# Patient Record
Sex: MALE | Race: WHITE | NOT HISPANIC OR LATINO | Employment: UNEMPLOYED | URBAN - METROPOLITAN AREA
[De-identification: names, ages, dates, MRNs, and addresses within clinical notes are randomized per-mention and may not be internally consistent; named-entity substitution may affect disease eponyms.]

---

## 2020-12-14 NOTE — PROGRESS NOTES
Subjective     Jeronimo Rodriguez is a 30 year old male who presents to clinic today for the following health issues:    HPI       Depression and Anxiety Follow-Up    How are you doing with your depression since your last visit? No change    How are you doing with your anxiety since your last visit?  Worsened     Are you having other symptoms that might be associated with depression or anxiety? Yes:  shakey, nervous, hard to sleep     Have you had a significant life event? No     Do you have any concerns with your use of alcohol or other drugs? No    Social History     Tobacco Use     Smoking status: Current Every Day Smoker     Smokeless tobacco: Never Used   Substance Use Topics     Alcohol use: Not Currently     Drug use: Not Currently     Types: Opiates, Amphetamines     PHQ 12/15/2020   PHQ-9 Total Score 25   Q9: Thoughts of better off dead/self-harm past 2 weeks Nearly every day     No flowsheet data found.  Last PHQ-9 12/15/2020   1.  Little interest or pleasure in doing things 3   2.  Feeling down, depressed, or hopeless 3   3.  Trouble falling or staying asleep, or sleeping too much 2   4.  Feeling tired or having little energy 2   5.  Poor appetite or overeating 3   6.  Feeling bad about yourself 3   7.  Trouble concentrating 3   8.  Moving slowly or restless 3   Q9: Thoughts of better off dead/self-harm past 2 weeks 3   PHQ-9 Total Score 25   Difficulty at work, home, or with people Extremely dIfficult     No flowsheet data found.    Suicide Assessment Five-step Evaluation and Treatment (SAFE-T)        Establish care-     Pt with depression and anxiety. Doing okay on zyprexa per pt  Has follow-up appt with psych next month  Denies any thoughts of hurting himself at this time. Feels the zyprexa is helpful  Notes his restless leg seems to be flared up with this medication  He has had it in the past  Notes it gets better if he walks at night  Normal hgb recently  Will do trial of gabapentin at night to see if  helps  If not helpful would recommend follow-up with psych to see if can change med zyprexa if this is the culprit for his symptoms    Review of Systems   Constitutional, HEENT, cardiovascular, pulmonary, gi and gu systems are negative, except as otherwise noted.      Objective    /82   Pulse 76   Temp 97.8  F (36.6  C) (Oral)   Ht 1.829 m (6')   Wt 84.8 kg (187 lb)   BMI 25.36 kg/m    Body mass index is 25.36 kg/m .  Physical Exam   GENERAL: healthy, alert and no distress  RESP: lungs clear to auscultation - no rales, rhonchi or wheezes  CV: regular rate and rhythm, normal S1 S2, no S3 or S4, no murmur, click or rub, no peripheral edema and peripheral pulses strong    No results found for this or any previous visit (from the past 24 hour(s)).        Assessment & Plan     Restless leg syndrome  Follow-up if not improving  - gabapentin (NEURONTIN) 300 MG capsule; Take one tablet QPM for 3 days, may increase to 2 tablets per QPM     Tobacco Cessation:   reports that he has been smoking. He has never used smokeless tobacco.  Tobacco Cessation Action Plan: Information offered: Patient not interested at this time      BMI:   Estimated body mass index is 25.36 kg/m  as calculated from the following:    Height as of this encounter: 1.829 m (6').    Weight as of this encounter: 84.8 kg (187 lb).          Depression Screening Follow Up    PHQ 12/15/2020   PHQ-9 Total Score 25   Q9: Thoughts of better off dead/self-harm past 2 weeks Nearly every day                 Follow Up    Follow Up Actions Taken      Discussed the following ways the patient can remain in a safe environment:            No follow-ups on file.    Linda Maher MD  [facility]

## 2020-12-15 ENCOUNTER — OFFICE VISIT (OUTPATIENT)
Dept: FAMILY MEDICINE | Facility: CLINIC | Age: 30
End: 2020-12-15
Payer: COMMERCIAL

## 2020-12-15 VITALS
WEIGHT: 187 LBS | SYSTOLIC BLOOD PRESSURE: 130 MMHG | HEART RATE: 76 BPM | TEMPERATURE: 97.8 F | DIASTOLIC BLOOD PRESSURE: 82 MMHG | BODY MASS INDEX: 25.33 KG/M2 | HEIGHT: 72 IN

## 2020-12-15 DIAGNOSIS — G25.81 RESTLESS LEG SYNDROME: Primary | ICD-10-CM

## 2020-12-15 PROCEDURE — 96127 BRIEF EMOTIONAL/BEHAV ASSMT: CPT | Performed by: FAMILY MEDICINE

## 2020-12-15 PROCEDURE — 99213 OFFICE O/P EST LOW 20 MIN: CPT | Performed by: FAMILY MEDICINE

## 2020-12-15 RX ORDER — GABAPENTIN 300 MG/1
CAPSULE ORAL
Qty: 60 CAPSULE | Refills: 1 | Status: SHIPPED | OUTPATIENT
Start: 2020-12-15

## 2020-12-15 RX ORDER — OLANZAPINE 10 MG/1
10 TABLET ORAL
COMMUNITY
Start: 2020-12-01 | End: 2021-01-08

## 2020-12-15 SDOH — HEALTH STABILITY: MENTAL HEALTH: HOW MANY STANDARD DRINKS CONTAINING ALCOHOL DO YOU HAVE ON A TYPICAL DAY?: NOT ASKED

## 2020-12-15 SDOH — HEALTH STABILITY: MENTAL HEALTH: HOW OFTEN DO YOU HAVE A DRINK CONTAINING ALCOHOL?: NOT ASKED

## 2020-12-15 SDOH — HEALTH STABILITY: MENTAL HEALTH: HOW OFTEN DO YOU HAVE 6 OR MORE DRINKS ON ONE OCCASION?: NOT ASKED

## 2020-12-15 ASSESSMENT — PATIENT HEALTH QUESTIONNAIRE - PHQ9: SUM OF ALL RESPONSES TO PHQ QUESTIONS 1-9: 25

## 2020-12-15 ASSESSMENT — MIFFLIN-ST. JEOR: SCORE: 1841.23

## 2020-12-31 ENCOUNTER — VIRTUAL VISIT (OUTPATIENT)
Dept: PSYCHOLOGY | Facility: CLINIC | Age: 30
End: 2020-12-31
Payer: COMMERCIAL

## 2020-12-31 DIAGNOSIS — F43.23 ADJUSTMENT DISORDER WITH MIXED ANXIETY AND DEPRESSED MOOD: Primary | ICD-10-CM

## 2020-12-31 PROCEDURE — 90834 PSYTX W PT 45 MINUTES: CPT | Mod: 95 | Performed by: SOCIAL WORKER

## 2020-12-31 NOTE — PROGRESS NOTES
"                                           Progress Note    Patient Name: Jeronimo Rodriguez  Date: December 31st, 2020         Service Type: Individual      Session Start Time: 8:37 am   Session End Time: ***     Session Length: ***    Session #: 1st session    Attendees: Client attended alone    Service Modality:  Video Visit:      Provider verified identity through the following two step process.  Patient provided:  Patient photo    Telemedicine Visit: The patient's condition can be safely assessed and treated via synchronous audio and visual telemedicine encounter.      Reason for Telemedicine Visit: Patient has requested telehealth visit    Originating Site (Patient Location): Patient's home    Distant Site (Provider Location): Provider Remote Setting    Consent:  The patient/guardian has verbally consented to: the potential risks and benefits of telemedicine (video visit) versus in person care; bill my insurance or make self-payment for services provided; and responsibility for payment of non-covered services.     Patient would like the video invitation sent by: None    Mode of Communication:  Video Conference via [employer]    As the provider I attest to compliance with applicable laws and regulations related to telemedicine.     Treatment Plan Last Reviewed: None-1st session   PHQ-9 / IBRAHIMA-7 : ***    DATA  Interactive Complexity: {25521 add on - Interactive Complexity:096934}  Crisis: {87980 (60 min) / 85522 (30 min add-on; stackable if needed):105433}     Progress Since Last Session (Related to Symptoms / Goals / Homework):   Symptoms: {FCCStatusSinceLastSession:150136} No interest in life-doesn't want to go to school, doesn't want to do anything, no motivation, depression, suicidal at times, bored, unhappy, \"I don't belong in this world with everything...money etc\".   Referred to therapy by someone; uncertain who.     Homework: None-1st session.       Episode of Care Goals: {Goals Progress:084724}     Current / " "Ongoing Stressors and Concerns:   ***     Treatment Objective(s) Addressed in This Session:   {Treatment Objective Groups:043880}  ***     Intervention:   {FCCINTERVENTION:859061}        ASSESSMENT: Current Emotional / Mental Status (status of significant symptoms):   Risk status (Self / Other harm or suicidal ideation)   Patient {PERSONAL SAFETY:583274}   Patient {SUICIDAL IDEATION:283278}   Patient {HOMICIDAL IDEATION:944569}   Patient {SELF INJURIOUS:734412}   Patient {OTHER SAFETY CONCERNS:295175}   Patient {FCC CHANGE IN RISK FACTORS:523366}   Patient {FCC CHANGE IN PROTECTIVE FACTORS:375405}   {SAFETY PLAN:571886}     Appearance:   {Appearance:242826::\"Appropriate \"}   Eye Contact:   {Eye Contact:701207::\"Good \"}   Psychomotor Behavior: {Psychomotor Behavior:825011::\"Normal \"}   Attitude:   {Attitude:694170::\"Cooperative \"}   Orientation:   {Orientation:113554::\"All\"}   Speech    Rate / Production: {Speech Rate/Production:965691::\"Normal \"}    Volume:  {Speech Volume:131379::\"Normal \"}   Mood:    {Mood:526207::\"Normal\"}   Affect:    {Affect:476155::\"Appropriate \"}   Thought Content:  {Thought Content:104878::\"Clear \"}   Thought Form:  {Thought Form:912260::\"Coherent \",\"Logical \"}   Insight:    {Insight:585102::\"Good \"}     Medication Review:   {Med Review:140116}     Medication Compliance:   {Changes:989898}     Changes in Health Issues:   {YES / NO:304627}     Chemical Use Review:   Substance Use: \"I try to quit and then I start again. I try to quit heroin and  then it is worse without it. I think I will do this then I will be happy and then  it is is worse.\" Stopped using meth a few weeks ago then 3-4 months  ago stopped using heroin. Reports he just decided to stop. The patient  reports that he has gone to treatment and AA/NA in the past and has  gone within the past few weeks.      Tobacco Use: Smoking cigarettes about a pack per day.     Diagnosis:  No diagnosis found.    Collateral Reports " Completed:   {[facility] COLLATERAL:559014}    PLAN: (Patient Tasks / Therapist Tasks / Other)  ***        Silke Luevano, [facility], [facility]

## 2020-12-31 NOTE — PROGRESS NOTES
"                                           Progress Note    Patient Name: Jeronimo Rodriguez  Date: December 31st, 2020         Service Type: Individual      Session Start Time: 8:37 am   Session End Time: 9:17 am     Session Length: 40 minutes     Session #: 1st session    Attendees: Client attended alone    Service Modality:  Video Visit:      Provider verified identity through the following two step process.  Patient provided:  Patient photo    Telemedicine Visit: The patient's condition can be safely assessed and treated via synchronous audio and visual telemedicine encounter.      Reason for Telemedicine Visit: Patient has requested telehealth visit    Originating Site (Patient Location): Patient's home    Distant Site (Provider Location): Provider Remote Setting    Consent:  The patient/guardian has verbally consented to: the potential risks and benefits of telemedicine (video visit) versus in person care; bill my insurance or make self-payment for services provided; and responsibility for payment of non-covered services.     Patient would like the video invitation sent by: None    Mode of Communication:  Video Conference via [employer]    As the provider I attest to compliance with applicable laws and regulations related to telemedicine.     Treatment Plan Last Reviewed: None-1st session   PHQ-9 / IBRAHIMA-7 : Will work on completing it next session.     DATA  Interactive Complexity: No  Crisis: No     Progress Since Last Session (Related to Symptoms / Goals / Homework):   Symptoms: No interest in life-doesn't want to go to school, doesn't want to do anything, no motivation, depression, suicidal at times, bored, unhappy, \"I don't belong in this world with everything...money etc\".   Laying in bed from 530pm to later in the morning.   Referred to therapy by someone; uncertain who.     Homework: None-1st session.       Episode of Care Goals: No improvement - PRECONTEMPLATION (Not seeing need for change); Intervened by " educating the patient about the effects of current behavior on health.  Evoked information about reasons to continue behavior, express concern / recommendations, and explored any change talk     Current / Ongoing Stressors and Concerns:   Stressed with COVID, low motivation, low energy, no interest in doing  anything.      Treatment Objective(s) Addressed in This Session:   Mainly focused on getting to know the patient-goals should be to stay sober and also work towards improving depression and mental health symptoms.      Intervention:   Motivational Interviewing    MI Intervention: Expressed Empathy/Understanding, Supported Autonomy, Collaboration, Evocation, Permission to raise concern or advise, Open-ended questions, Reflections: simple and complex, Change talk (evoked) and Reframe     Change Talk Expressed by the Patient: Desire to change Ability to change Reasons to change Need to change Committment to change Activation Taking steps    Provider Response to Change Talk: E - Evoked more info from patient about behavior change, A - Affirmed patient's thoughts, decisions, or attempts at behavior change, R - Reflected patient's change talk and S - Summarized patient's change talk statements          ASSESSMENT: Current Emotional / Mental Status (status of significant symptoms):   Risk status (Self / Other harm or suicidal ideation)   Patient denies current fears or concerns for personal safety.   Patient denies current or recent suicidal ideation or behaviors.  Reports doing better, but limited things to do. He denies recent suicidal thoughts.    Patient denies current or recent homicidal ideation or behaviors.   Patient denies current or recent self injurious behavior or ideation.   Patient denies other safety concerns.   Patient reports there has been no change in risk factors since their last session.     Patient reports there has been no change in protective factors since their last session.     Recommended  "that patient call 911 or go to the local ED should there be a change in any of these risk factors. the patient has a history of SI, but we will not get to a safety crisis assessment today.      Appearance:   Wearing a hoodie and has the candelario pulled up, so hard to tell exactly how his apperance is.    Eye Contact:   Fair    Psychomotor Behavior: Restless    Attitude:   Cooperative    Orientation:   All   Speech    Rate / Production: Talkative    Volume:  Normal    Mood:    Anxious  Depressed  Sad    Affect:    Flat    Thought Content:  Clear    Thought Form:  Flight of Ideas    Insight:    Fair      Medication Review:   No changes to current psychiatric medication(s)- Bipolar and Restless Nerve  Damage-Neurontin 300 mgs and Zyprexa 10 mgs. Medications cause him to be tired. They lowered the dose to help with the patient being tired and this did help some, but not a lot.    Uncertain who he sees for the Gabapentin and Zyprexa because he has seen different people between ER's and seeing doctors.      Medication Compliance:   Yes     Changes in Health Issues:   \"I think I am going through liver failure because of drug use and have tried to kill myself often, so my body seems to be shutting down\" Reports he thinks he has hepatitis.    Recommend patient follow-up with a doctor to get his hepatitis checked out further.      Chemical Use Review:   Substance Use: \"I try to quit and then I start again. I try to quit heroin and  then it is worse without it. I think I will do this then I will be happy and then  it is is worse.\" Stopped using meth a few weeks ago then 3-4 months  ago stopped using heroin. Reports he just decided to stop. The patient  reports that he has gone to treatment and AA/NA in the past and has  gone within the past few weeks.    Patient reports he had a near death experience when he quit using heroin and he stopped using substances.      Tobacco Use: Smoking cigarettes about a pack per day. "     Diagnosis:  1. Adjustment disorder with mixed anxiety and depressed mood      Collateral Reports Completed:   Not Applicable    PLAN: (Patient Tasks / Therapist Tasks / Other)    We will continue to work on doing the DA and Treatment Plan going forward. Will need to follow patient for substance abuse as his recent use could be really concerning for continuing therapy, so will monitor this. He could use support for his depression. I did have to redirect him related to asking personal questions of myself. He would benefit from weekly sessions. We should complete the Crisis Assessment and also do the IBRAHIMA-7 in the next couple sessions.     Silke Luevano, JANNETTE, LADC

## 2021-01-15 ENCOUNTER — HEALTH MAINTENANCE LETTER (OUTPATIENT)
Age: 31
End: 2021-01-15

## 2021-09-19 ENCOUNTER — HEALTH MAINTENANCE LETTER (OUTPATIENT)
Age: 31
End: 2021-09-19

## 2022-03-06 ENCOUNTER — HEALTH MAINTENANCE LETTER (OUTPATIENT)
Age: 32
End: 2022-03-06

## 2022-08-06 ENCOUNTER — OFFICE VISIT (OUTPATIENT)
Dept: URGENT CARE | Facility: URGENT CARE | Age: 32
End: 2022-08-06
Payer: COMMERCIAL

## 2022-08-06 VITALS
SYSTOLIC BLOOD PRESSURE: 128 MMHG | BODY MASS INDEX: 25.76 KG/M2 | RESPIRATION RATE: 12 BRPM | DIASTOLIC BLOOD PRESSURE: 80 MMHG | OXYGEN SATURATION: 98 % | TEMPERATURE: 98.8 F | HEART RATE: 72 BPM | WEIGHT: 189.9 LBS

## 2022-08-06 DIAGNOSIS — R53.83 FATIGUE, UNSPECIFIED TYPE: ICD-10-CM

## 2022-08-06 DIAGNOSIS — R11.2 NON-INTRACTABLE VOMITING WITH NAUSEA, UNSPECIFIED VOMITING TYPE: ICD-10-CM

## 2022-08-06 DIAGNOSIS — G44.209 TENSION HEADACHE: Primary | ICD-10-CM

## 2022-08-06 LAB
FLUAV AG SPEC QL IA: NEGATIVE
FLUBV AG SPEC QL IA: NEGATIVE

## 2022-08-06 PROCEDURE — 99213 OFFICE O/P EST LOW 20 MIN: CPT | Mod: CS | Performed by: PHYSICIAN ASSISTANT

## 2022-08-06 PROCEDURE — 87804 INFLUENZA ASSAY W/OPTIC: CPT | Performed by: PHYSICIAN ASSISTANT

## 2022-08-06 PROCEDURE — U0005 INFEC AGEN DETEC AMPLI PROBE: HCPCS | Performed by: PHYSICIAN ASSISTANT

## 2022-08-06 PROCEDURE — U0003 INFECTIOUS AGENT DETECTION BY NUCLEIC ACID (DNA OR RNA); SEVERE ACUTE RESPIRATORY SYNDROME CORONAVIRUS 2 (SARS-COV-2) (CORONAVIRUS DISEASE [COVID-19]), AMPLIFIED PROBE TECHNIQUE, MAKING USE OF HIGH THROUGHPUT TECHNOLOGIES AS DESCRIBED BY CMS-2020-01-R: HCPCS | Performed by: PHYSICIAN ASSISTANT

## 2022-08-06 NOTE — PATIENT INSTRUCTIONS
Drink plenty of fluids and rest  Go to the emergency room if pain worsens or persists beyond 5 days

## 2022-08-06 NOTE — PROGRESS NOTES
"Assessment & Plan     Tension headache  - Influenza A & B Antigen - Clinic Collect  - Symptomatic; Unknown COVID-19 Virus (Coronavirus) by PCR Nasopharyngeal    Fatigue, unspecified type  - Influenza A & B Antigen - Clinic Collect  - Symptomatic; Unknown COVID-19 Virus (Coronavirus) by PCR Nasopharyngeal    Non-intractable vomiting with nausea, unspecified vomiting type  - Influenza A & B Antigen - Clinic Collect    Go to emergency room if pain worsens or does not resolve in 5 days.    15 minutes spent on the date of the encounter doing chart review, patient visit, and documentation     Return in about 1 week (around 8/13/2022) for visit with primary care provider if symptoms are persistent.     [facility]    Subjective   Jeronimo Rodriguez is a 31 year old who presents for the following health issues     HPI    Jeronimo presents clinic today for evaluation of a headache.  Symptoms first began 3 days ago.  He had a headache and vomiting.  He has vomited once on the first day and since then has been feeling nauseous but not vomiting.  He describes the pain as feeling like it is behind his eyes and it comes and goes. He states, \"Its just a normal headache.\"  Eyes feel dry and itchy.  No changes in vision.  Nausea seems to come and go as well.  He is been trying to drink water just to make it helps with his nausea and headache.  He is been taking Tylenol which has not seemed to make much of a difference.  He has been feeling feverish but also has not taken his temperature.  He is sleeping okay but just notes that when he wakes that he generally has a headache.    Review of Systems   ROS negative except as stated above.      Objective    /80   Pulse 72   Temp 98.8  F (37.1  C) (Tympanic)   Resp 12   Wt 86.1 kg (189 lb 14.4 oz)   SpO2 98%   BMI 25.76 kg/m    Physical Exam   GENERAL: healthy, alert and no distress, sitting with eyes closed while discussing " symptoms.  EYES: Eyes grossly normal to inspection, PERRL and conjunctivae and sclerae normal  HENT: ear canals and TM's normal, nose and mouth without ulcers or lesions  NECK: no adenopathy, no asymmetry, masses, or scars and thyroid normal to palpation  RESP: lungs clear to auscultation - no rales, rhonchi or wheezes  CV: regular rate and rhythm, normal S1 S2, no S3 or S4, no murmur, click or rub, no peripheral edema and peripheral pulses strong  MS: no gross musculoskeletal defects noted, no edema  SKIN: no suspicious lesions or rashes  NEURO: Normal strength and tone, mentation intact and speech normal CN 2-12 grossly intact    Results for orders placed or performed in visit on 08/06/22   Influenza A & B Antigen - Clinic Collect     Status: Normal    Specimen: Nasopharyngeal; Swab   Result Value Ref Range    Influenza A antigen Negative Negative    Influenza B antigen Negative Negative    Narrative    Test results must be correlated with clinical data. If necessary, results should be confirmed by a molecular assay or viral culture.

## 2022-08-07 LAB — SARS-COV-2 RNA RESP QL NAA+PROBE: NEGATIVE

## 2022-08-08 ENCOUNTER — OFFICE VISIT (OUTPATIENT)
Dept: URGENT CARE | Facility: URGENT CARE | Age: 32
End: 2022-08-08
Payer: COMMERCIAL

## 2022-08-08 VITALS
HEART RATE: 68 BPM | TEMPERATURE: 98.6 F | DIASTOLIC BLOOD PRESSURE: 77 MMHG | HEIGHT: 72 IN | BODY MASS INDEX: 25.47 KG/M2 | RESPIRATION RATE: 16 BRPM | SYSTOLIC BLOOD PRESSURE: 122 MMHG | WEIGHT: 188 LBS | OXYGEN SATURATION: 98 %

## 2022-08-08 DIAGNOSIS — J06.9 UPPER RESPIRATORY TRACT INFECTION, UNSPECIFIED TYPE: Primary | ICD-10-CM

## 2022-08-08 LAB
ALBUMIN UR-MCNC: NEGATIVE MG/DL
APPEARANCE UR: CLEAR
BASOPHILS # BLD AUTO: 0 10E3/UL (ref 0–0.2)
BASOPHILS NFR BLD AUTO: 0 %
BILIRUB UR QL STRIP: ABNORMAL
COLOR UR AUTO: YELLOW
EOSINOPHIL # BLD AUTO: 0.1 10E3/UL (ref 0–0.7)
EOSINOPHIL NFR BLD AUTO: 2 %
ERYTHROCYTE [DISTWIDTH] IN BLOOD BY AUTOMATED COUNT: 12.6 % (ref 10–15)
GLUCOSE UR STRIP-MCNC: NEGATIVE MG/DL
HCT VFR BLD AUTO: 46.4 % (ref 40–53)
HGB BLD-MCNC: 16.4 G/DL (ref 13.3–17.7)
HGB UR QL STRIP: NEGATIVE
KETONES UR STRIP-MCNC: NEGATIVE MG/DL
LEUKOCYTE ESTERASE UR QL STRIP: NEGATIVE
LYMPHOCYTES # BLD AUTO: 2.3 10E3/UL (ref 0.8–5.3)
LYMPHOCYTES NFR BLD AUTO: 38 %
MCH RBC QN AUTO: 31.5 PG (ref 26.5–33)
MCHC RBC AUTO-ENTMCNC: 35.3 G/DL (ref 31.5–36.5)
MCV RBC AUTO: 89 FL (ref 78–100)
MONOCYTES # BLD AUTO: 0.5 10E3/UL (ref 0–1.3)
MONOCYTES NFR BLD AUTO: 8 %
NEUTROPHILS # BLD AUTO: 3.1 10E3/UL (ref 1.6–8.3)
NEUTROPHILS NFR BLD AUTO: 52 %
NITRATE UR QL: NEGATIVE
PH UR STRIP: 6 [PH] (ref 5–7)
PLATELET # BLD AUTO: 286 10E3/UL (ref 150–450)
RBC # BLD AUTO: 5.21 10E6/UL (ref 4.4–5.9)
SP GR UR STRIP: >=1.03 (ref 1–1.03)
UROBILINOGEN UR STRIP-ACNC: 0.2 E.U./DL
WBC # BLD AUTO: 6 10E3/UL (ref 4–11)

## 2022-08-08 PROCEDURE — 36415 COLL VENOUS BLD VENIPUNCTURE: CPT | Performed by: NURSE PRACTITIONER

## 2022-08-08 PROCEDURE — 81003 URINALYSIS AUTO W/O SCOPE: CPT | Performed by: NURSE PRACTITIONER

## 2022-08-08 PROCEDURE — 99213 OFFICE O/P EST LOW 20 MIN: CPT | Performed by: NURSE PRACTITIONER

## 2022-08-08 PROCEDURE — 85025 COMPLETE CBC W/AUTO DIFF WBC: CPT | Performed by: NURSE PRACTITIONER

## 2022-08-08 ASSESSMENT — PAIN SCALES - GENERAL: PAINLEVEL: SEVERE PAIN (7)

## 2022-08-08 NOTE — PROGRESS NOTES
SUBJECTIVE:   Jeronimo Rodriguez is a 31 year old male presenting with a chief complaint of cold symptoms and fatigue  Onset of symptoms was 5 day(s) ago.  Current and Associated symptoms: cough   No sore throat or runny nose, no fever, no urinary pain or abdominal pain  Had vomiting and diarrhea and headache that is gone  Treatment measures tried include: fluids rest  Predisposing factors include None.  Seen 2 days ago covid and influenza negative  Appetite down, drinking enough fluids.     No past medical history on file.  Current Outpatient Medications   Medication Sig Dispense Refill     gabapentin (NEURONTIN) 300 MG capsule Take one tablet QPM for 3 days, may increase to 2 tablets per QPM (Patient not taking: Reported on 8/6/2022) 60 capsule 1     OLANZapine (ZYPREXA) 10 MG tablet Take 10 mg by mouth       Social History     Tobacco Use     Smoking status: Current Every Day Smoker     Smokeless tobacco: Never Used   Substance Use Topics     Alcohol use: Not Currently       ROS:  Review of systems negative except as stated above.    OBJECTIVE:  /77   Pulse 68   Temp 98.6  F (37  C) (Tympanic)   Resp 16   Ht 1.829 m (6')   Wt 85.3 kg (188 lb)   SpO2 98%   BMI 25.50 kg/m    GENERAL APPEARANCE: alert and no distress  EYES: EOMI,  PERRL, conjunctiva clear  HENT: ear canals and TM's normal.  Nose and mouth without ulcers, erythema or lesions  NECK: supple, nontender, no lymphadenopathy  RESP: lungs clear to auscultation - no rales, rhonchi or wheezes  CV: regular rates and rhythm, normal S1 S2, no murmur noted  ABDOMEN:  soft, nontender, no HSM or masses and bowel sounds normal  NEURO: Normal strength and tone, sensory exam grossly normal,  normal speech and mentation  SKIN: no suspicious lesions or rashes    ASSESSMENT:  (J06.9) Upper respiratory tract infection, unspecified type  (primary encounter diagnosis)      PLAN:  UA and cbc normal  Likely viral 7-10 days  Vitals stable in clinic and hydrated  nontoxic appearing no distress. Symptoms seem to be slowly improving  Emergent symptoms as reviewed to ER  Not improving f/u with pcp in a few days.      Shruthi Gomez, APRN CNP

## 2022-08-20 ENCOUNTER — OFFICE VISIT (OUTPATIENT)
Dept: URGENT CARE | Facility: URGENT CARE | Age: 32
End: 2022-08-20
Payer: COMMERCIAL

## 2022-08-20 VITALS
SYSTOLIC BLOOD PRESSURE: 113 MMHG | OXYGEN SATURATION: 98 % | TEMPERATURE: 98.3 F | DIASTOLIC BLOOD PRESSURE: 68 MMHG | RESPIRATION RATE: 17 BRPM | HEART RATE: 83 BPM

## 2022-08-20 DIAGNOSIS — L08.9 SKIN INFECTION: Primary | ICD-10-CM

## 2022-08-20 PROCEDURE — 99213 OFFICE O/P EST LOW 20 MIN: CPT | Performed by: FAMILY MEDICINE

## 2022-08-20 RX ORDER — CEPHALEXIN 500 MG/1
500 CAPSULE ORAL 4 TIMES DAILY
Qty: 28 CAPSULE | Refills: 0 | Status: SHIPPED | OUTPATIENT
Start: 2022-08-20 | End: 2022-08-27

## 2022-08-20 ASSESSMENT — PAIN SCALES - GENERAL: PAINLEVEL: EXTREME PAIN (8)

## 2022-08-20 NOTE — PROGRESS NOTES
Assessment & Plan     Skin infection  Differential discussed in detail.  Suspect symptoms secondary to skin infection versus lymphadenopathy.  Cephalexin prescribed, common side effects discussed.  Recommended to keep area dry and clean and follow-up with PCP in 3 to 5 days or earlier if needed.  All questions answered.  - cephALEXin (KEFLEX) 500 MG capsule; Take 1 capsule (500 mg) by mouth 4 times daily for 7 days      Moshe Nava MD  [facility]    Columba Decker is a 31 year old presenting for the following health issues:  Urgent Care and Ear Problem       HPI   Complains of a small swelling under left ear, having it for about 1 week or so, painful and erythematous.  No Fever, chills, sore throat, runny nose or other relevant systemic symptoms.         Review of Systems   Constitutional, HEENT, cardiovascular, pulmonary, gi and gu systems are negative, except as otherwise noted.      Objective    /68   Pulse 83   Temp 98.3  F (36.8  C) (Tympanic)   Resp 17   SpO2 98%   There is no height or weight on file to calculate BMI.  Physical Exam   GENERAL: alert and no distress  HENT: nose and mouth without ulcers or lesions, oropharynx clear, oral mucous membranes moist and erythematous skin under left earlobe, tender on palpation, few acne lesions involving forehead and neck, no fluctuance, blisters or discharge noted  NECK: no adenopathy, no asymmetry, masses, or scars and thyroid normal to palpation  RESP: lungs clear to auscultation - no rales, rhonchi or wheezes  MS: no gross musculoskeletal defects noted, no edema  SKIN: As above  NEURO: Grossly intact                        .  ..

## 2022-11-21 ENCOUNTER — TELEPHONE (OUTPATIENT)
Dept: BEHAVIORAL HEALTH | Facility: CLINIC | Age: 32
End: 2022-11-21

## 2022-11-21 ENCOUNTER — HEALTH MAINTENANCE LETTER (OUTPATIENT)
Age: 32
End: 2022-11-21

## 2022-11-21 NOTE — TELEPHONE ENCOUNTER
[facility] attempted to reach patient for [facility] offer following up on ED/IHP report. Call went straight to voicemail and the voicemail box was full. [facility] will follow up with a [employer] message.     Monika Johnson [facility]  Behavioral Health Home ([facility])   [facility]  [phone]

## 2022-11-30 ENCOUNTER — TELEPHONE (OUTPATIENT)
Dept: BEHAVIORAL HEALTH | Facility: CLINIC | Age: 32
End: 2022-11-30

## 2022-11-30 NOTE — LETTER
Dear Jeronimo:    I am the Behavioral [facility] [place] Social Work Care Coordinator that works closely with your Primary Care Provider (PCP), to support your healthy living goals.  This letter serves to inform you that you are eligible for Behavioral Health Home Services that is a paid service through your insurance and is free of cost to you. Behavioral Health Home ([facility]) is a program created to help integrate your primary care clinic and provide services in addition to caring for your physical health. The following are examples of topics that we can assist with if you are to enroll in Behavioral Health Home ([facility]):    - Housing Resoures  - Transportation Resources  - Financial Resources  - Coordination with the [facility] for Benefits (MA, SNAP benefits, etc)  - Medical Appointments and Medication Costs  - Employment and Education  - Disability Related Information and Education  - Referrals to mental health services, chemical dependency assessment/treatment, etc     I have enclosed the [facility] Handout that offers basic information of what our program entails. If you are interested in learning more about and/or enrolling in Behavioral Health Home services, you may ask your PCP to connect you with me or feel free to contact me to schedule an appointment. My contact information is listed below.    I look forward to hearing from you!        Monika Johnson, [facility]  Behavioral [facility] ([facility])   [phone]

## 2022-11-30 NOTE — TELEPHONE ENCOUNTER
[facility] attempted to call patient for second attempt of [facility] offer but the call went straight to voicemail and the voicemail has not been set up yet. [facility] noticed that [employer] message was not read. [facility] will follow up with a letter. [facility] checked to see if there were any upcoming appointments but there are not.     Monika Johnson LGSW [facility] ([facility])   [facility]  [phone]

## 2022-12-09 ENCOUNTER — TELEPHONE (OUTPATIENT)
Dept: BEHAVIORAL HEALTH | Facility: CLINIC | Age: 32
End: 2022-12-09

## 2022-12-09 NOTE — TELEPHONE ENCOUNTER
[facility] received another alert due to ED/IP utilization.  Completed another outreach attempt by calling patient.  No answer and voice mail not set up so was unable to leave a message.  [employer] message and letter sent recently to patient so [facility] did not send additional messages.      Lore Denton, [facility]  [facility] ([facility])   [facility]  [phone]

## 2023-04-16 ENCOUNTER — HEALTH MAINTENANCE LETTER (OUTPATIENT)
Age: 33
End: 2023-04-16

## 2024-06-23 ENCOUNTER — HEALTH MAINTENANCE LETTER (OUTPATIENT)
Age: 34
End: 2024-06-23

## 2025-07-12 ENCOUNTER — HEALTH MAINTENANCE LETTER (OUTPATIENT)
Age: 35
End: 2025-07-12